# Patient Record
Sex: FEMALE | Race: BLACK OR AFRICAN AMERICAN | Employment: UNEMPLOYED | URBAN - METROPOLITAN AREA
[De-identification: names, ages, dates, MRNs, and addresses within clinical notes are randomized per-mention and may not be internally consistent; named-entity substitution may affect disease eponyms.]

---

## 2022-10-31 ENCOUNTER — HOSPITAL ENCOUNTER (EMERGENCY)
Age: 32
Discharge: LEFT BEFORE DISCHARGE | End: 2022-10-31
Attending: EMERGENCY MEDICINE

## 2022-10-31 VITALS
WEIGHT: 158.29 LBS | OXYGEN SATURATION: 100 % | RESPIRATION RATE: 21 BRPM | SYSTOLIC BLOOD PRESSURE: 133 MMHG | HEIGHT: 61 IN | TEMPERATURE: 98.2 F | HEART RATE: 112 BPM | DIASTOLIC BLOOD PRESSURE: 83 MMHG | BODY MASS INDEX: 29.89 KG/M2

## 2022-10-31 DIAGNOSIS — T78.40XA ALLERGIC REACTION, INITIAL ENCOUNTER: Primary | ICD-10-CM

## 2022-10-31 PROCEDURE — 2500000003 HC RX 250 WO HCPCS: Performed by: PHYSICIAN ASSISTANT

## 2022-10-31 PROCEDURE — 6360000002 HC RX W HCPCS: Performed by: PHYSICIAN ASSISTANT

## 2022-10-31 PROCEDURE — A4216 STERILE WATER/SALINE, 10 ML: HCPCS | Performed by: PHYSICIAN ASSISTANT

## 2022-10-31 PROCEDURE — 99284 EMERGENCY DEPT VISIT MOD MDM: CPT

## 2022-10-31 PROCEDURE — 96372 THER/PROPH/DIAG INJ SC/IM: CPT

## 2022-10-31 PROCEDURE — 2580000003 HC RX 258: Performed by: PHYSICIAN ASSISTANT

## 2022-10-31 PROCEDURE — 96374 THER/PROPH/DIAG INJ IV PUSH: CPT

## 2022-10-31 RX ORDER — EPINEPHRINE 1 MG/ML
0.3 INJECTION, SOLUTION, CONCENTRATE INTRAVENOUS ONCE
Status: COMPLETED | OUTPATIENT
Start: 2022-10-31 | End: 2022-10-31

## 2022-10-31 RX ORDER — DIPHENHYDRAMINE HCL 25 MG
25 CAPSULE ORAL EVERY 6 HOURS PRN
Qty: 20 CAPSULE | Refills: 0 | Status: SHIPPED | OUTPATIENT
Start: 2022-10-31

## 2022-10-31 RX ORDER — PREDNISONE 20 MG/1
40 TABLET ORAL DAILY
Qty: 10 TABLET | Refills: 0 | Status: SHIPPED | OUTPATIENT
Start: 2022-10-31 | End: 2022-11-05

## 2022-10-31 RX ADMIN — EPINEPHRINE 0.3 MG: 1 INJECTION, SOLUTION, CONCENTRATE INTRAVENOUS at 10:43

## 2022-10-31 RX ADMIN — FAMOTIDINE 20 MG: 10 INJECTION, SOLUTION INTRAVENOUS at 10:45

## 2022-10-31 ASSESSMENT — ENCOUNTER SYMPTOMS
STRIDOR: 0
VOMITING: 1
FACIAL SWELLING: 1
TROUBLE SWALLOWING: 0
COLOR CHANGE: 0
SHORTNESS OF BREATH: 1
SORE THROAT: 0

## 2022-10-31 ASSESSMENT — PAIN SCALES - GENERAL
PAINLEVEL_OUTOF10: 0
PAINLEVEL_OUTOF10: 0

## 2022-10-31 ASSESSMENT — PAIN - FUNCTIONAL ASSESSMENT
PAIN_FUNCTIONAL_ASSESSMENT: 0-10
PAIN_FUNCTIONAL_ASSESSMENT: 0-10

## 2022-10-31 NOTE — ED TRIAGE NOTES
Pt arrived to dept via EMS. Pt c/o allergic reaction w/ angioedema. Pt awake, alert and oriented x 3. Resp easy and unlabored. Pt placed in gown and on cardiac monitor. Call light in reach. Will continue to monitor.

## 2022-10-31 NOTE — ED PROVIDER NOTES
[address]      Pt Name: Willa Brewer  MRN: [id number]  [place] 1990  Date of evaluation: 10/31/2022  Provider: RITCHIE Weaver    This patient was seen and evaluated by the attending physician Dr. Jaylon Brownlee. CHIEF COMPLAINT       Chief Complaint   Patient presents with    Allergic Reaction     Pt ate [employer] food for there first time last night woke up, N/V/D and angioedema. Pt feels as though she is having difficulty breathing. Face/mouth is visibly swollen, pt having difficulty speaking. Pt allergies are shellfish and pepper. CRITICAL CARE TIME   I performed a total Critical Care time of  31 minutes, excluding separately reportable procedures. There was a high probability of clinically significant/life threatening deterioration in the patient's condition which required my urgent intervention. Not limited to multiple reexaminations, discussions with attending physician and consultants. HISTORY OF PRESENT ILLNESS  (Location/Symptom, Timing/Onset, Context/Setting, Quality, Duration, Modifying Factors, Severity.)   Willa Brewer is a 33 y.o. female who presents to the emergency department with concern for allergic reaction. She tells me that she has some unknown allergy to spices and shellfish and last night she had [facility] [place] food for the first time. She states she went to bed and woke up with nausea vomiting, diarrhea and abdominal bloating. Around 4 AM started to have some facial swelling lower lip jaw and felt short of breath. She had a couple episodes of vomiting which she states is because she feels like her throat feels swollen and is gagging her. She did not take a thing prior by EMS gave her 125 Solu-Medrol and 50 of Benadryl. She tells me she has a mental health history but they have not recently changed her medications.   She is not on any ACE inhibitor's or blood pressure medication    Nursing Notes were reviewed and I agree. REVIEW OF SYSTEMS    (2-9 systems for level 4, 10 or more for level 5)     Review of Systems   Constitutional:  Negative for fever. HENT:  Positive for facial swelling. Negative for drooling, sore throat and trouble swallowing. Respiratory:  Positive for shortness of breath. Negative for stridor. Gastrointestinal:  Positive for vomiting. Musculoskeletal:  Negative for neck pain and neck stiffness. Skin:  Positive for rash. Negative for color change. Neurological:  Negative for weakness. Psychiatric/Behavioral:  Negative for behavioral problems and confusion. Except as noted above the remainder of the review of systems was reviewed and negative. PAST MEDICAL HISTORY   History reviewed. No pertinent past medical history. SURGICAL HISTORY     History reviewed. No pertinent surgical history. CURRENT MEDICATIONS       Discharge Medication List as of 10/31/2022  2:18 PM          ALLERGIES     Shellfish-derived products    FAMILY HISTORY     History reviewed. No pertinent family history. No family status information on file. SOCIAL HISTORY      reports that she has been smoking cigarettes. She has been smoking an average of .5 packs per day. She has never used smokeless tobacco. She reports current alcohol use. PHYSICAL EXAM    (up to 7 for level 4, 8 or more for level 5)     ED Triage Vitals [10/31/22 1053]   BP Temp Temp Source Heart Rate Resp SpO2 Height Weight   129/76 98.2 °F (36.8 °C) Oral 100 19 100 % 5' 1\" (1.549 m) 158 lb 4.6 oz (71.8 kg)       Physical Exam  Vitals and nursing note reviewed. Constitutional:       Appearance: Normal appearance. HENT:      Mouth/Throat:      Mouth: Mucous membranes are moist. Angioedema present. Pharynx: Oropharynx is clear. No pharyngeal swelling. Tonsils: No tonsillar exudate. Eyes:      Pupils: Pupils are equal, round, and reactive to light. Cardiovascular:      Rate and Rhythm: Normal rate. Pulmonary:      Effort: Pulmonary effort is normal. No respiratory distress. Breath sounds: No wheezing. Musculoskeletal:         General: Normal range of motion. Cervical back: Normal range of motion. Skin:     General: Skin is warm. Findings: Rash present. Neurological:      General: No focal deficit present. Mental Status: She is alert and oriented to person, place, and time. Psychiatric:         Mood and Affect: Mood normal.         Behavior: Behavior normal.       DIAGNOSTIC RESULTS     NONE    LABS:  Labs Reviewed - No data to display    All other labs were within normal range or not returned as of this dictation. EMERGENCY DEPARTMENT COURSE and DIFFERENTIAL DIAGNOSIS/MDM:   Vitals:    Vitals:    10/31/22 1315 10/31/22 1330 10/31/22 1345 10/31/22 1400   BP: (!) 120/95 128/81 (!) 129/94 133/83   Pulse: 98 (!) 108 (!) 103 (!) 112   Resp: 18 21 18 21   Temp:       TempSrc:       SpO2:       Weight:       Height:         Patient got 125 Solu-Medrol and Benadryl prior to arrival.  She has not hypoxic throughout her stay. She did have some tachycardia initially which resolved. She has some angioedema with lower lip swelling there is no evidence of tongue swelling or posterior oropharynx is clear with a swelling underneath her jaw with some hives along her neck. There was some minimal improvement with epinephrine she tolerated p.o. No further vomiting. We recommended admission due to the swelling and location the patient decided to leave [address] She will be discharged with steroids and Benadryl and instructed to follow-up and return if she changes her mind at any time. CONSULTS:  None    PROCEDURES:  Procedures      FINAL IMPRESSION      1. Allergic reaction, initial encounter          DISPOSITION/PLAN   DISPOSITION [place] 10/31/2022 02:13:18 PM      PATIENT REFERRED TO:  No follow-up provider specified.     DISCHARGE MEDICATIONS:  Discharge Medication List as of 10/31/2022  2:18 PM        START taking these medications    Details   predniSONE (DELTASONE) 20 MG tablet Take 2 tablets by mouth daily for 5 days, Disp-10 tablet, R-0Print      diphenhydrAMINE (BENADRYL) 25 MG capsule Take 1 capsule by mouth every 6 hours as needed for Itching or Allergies Sedation precautions, Disp-20 capsule, R-0Print             (Please note that portions of this note were completed with a voice recognition program.  Efforts were made to edit the dictations but occasionally words are mis-transcribed.)    Tana Campos, [place]  10/31/22 3488

## 2022-10-31 NOTE — ED PROVIDER NOTES
ED Attending Attestation Note    This patient was seen by the advanced practice provider. I personally saw the patient and performed a substantive portion of the visit including all aspects of the medical decision making. Briefly, 33 y.o. female presents for evaluation of allergic reaction. Ate [place] [facility] food last night. Some gastro intestinal symptoms thereafter. This morning at 0400, developed edema of the lower lip. Previous allergic reaction to shellfish. Focused exam:   Gen: 33 y.o. female, NAD  HEENT: NCAT. PERRL. EOMI. There is angioedema of the lower lip. No tongue swelling or edema of the posterior oropharynx. Managing secretions easily. CV: RRR w/o MRG  Lungs: CTAB. No incr WOB. Abdomen: Soft, nontender, nondistended. No rebound/guarding. MDM:   This is a 60-year-old female presenting with allergic reaction. She is protecting her airway at this time and it does not seem to be much in the way of edema of the posterior oropharynx. She was treated with epinephrine and is already received Benadryl and IV steroids prior to arrival.  She was observed for several hours in the emergency department. She remains with angioedema of the lower lip and sensation of tightness in her throat. She is without any respiratory distress and continues to be able to maintain her airway. The recommendation to the patient was admission for observation for ongoing allergic reaction. The patient is not certain that she wishes to be admitted as she has family responsibilities to take care of. We have a long conversation about the risks and benefits, risks including worsening condition, permanent disability and death; benefits including additional observation/monitoring and therapeutics as needed. Should the patient elect to leave [address] she understands she can return to the emergency department at any time if she wishes to continue her evaluation and treatment.   As a next best alternative she will be discharged [address] with a course of steroids and instructions to utilize antihistamines. CRITICAL CARE  I personally saw the patient and independently provided 25 minutes of non-concurrent critical care out of the total shared critical care time provided. This excludes seperately billable procedures. Critical care time was provided for allergic reaction requiring epinephrine and that required close evaluation and/or intervention with concern for potential patient decompensation. For further details of the patient's emergency department visit, please see the advanced practice provider's documentation. John Martinez MD     This report has been produced using speech recognition software and may contain errors related to that system including errors in grammar, punctuation, and spelling, as well as words and phrases that may be inappropriate. If there are any questions or concerns please feel free to contact the dictating provider for clarification.       John Martinez MD  10/31/22 4933

## 2022-12-19 ENCOUNTER — HOSPITAL ENCOUNTER (EMERGENCY)
Age: 32
Discharge: HOME | End: 2022-12-19
Payer: MEDICAID

## 2022-12-19 VITALS
WEIGHT: 142.6 LBS | HEIGHT: 61 IN | HEART RATE: 98 BPM | OXYGEN SATURATION: 100 % | TEMPERATURE: 98.4 F | SYSTOLIC BLOOD PRESSURE: 136 MMHG | DIASTOLIC BLOOD PRESSURE: 92 MMHG | BODY MASS INDEX: 26.92 KG/M2 | RESPIRATION RATE: 16 BRPM

## 2022-12-19 DIAGNOSIS — F41.0 PANIC ATTACK: Primary | ICD-10-CM

## 2022-12-19 PROCEDURE — 99283 EMERGENCY DEPT VISIT LOW MDM: CPT

## 2022-12-19 PROCEDURE — 6370000000 HC RX 637 (ALT 250 FOR IP): Performed by: NURSE PRACTITIONER

## 2022-12-19 RX ORDER — HYDROXYZINE PAMOATE 25 MG/1
50 CAPSULE ORAL ONCE
Status: COMPLETED | OUTPATIENT
Start: 2022-12-19 | End: 2022-12-19

## 2022-12-19 RX ORDER — PAROXETINE HYDROCHLORIDE 20 MG/1
20 TABLET, FILM COATED ORAL DAILY
Qty: 30 TABLET | Refills: 0 | Status: SHIPPED | OUTPATIENT
Start: 2022-12-19

## 2022-12-19 RX ORDER — HYDROXYZINE HYDROCHLORIDE 25 MG/1
25 TABLET, FILM COATED ORAL 3 TIMES DAILY PRN
Qty: 30 TABLET | Refills: 0 | Status: SHIPPED | OUTPATIENT
Start: 2022-12-19 | End: 2022-12-29

## 2022-12-19 RX ADMIN — HYDROXYZINE PAMOATE 50 MG: 25 CAPSULE ORAL at 19:58

## 2022-12-19 ASSESSMENT — PAIN - FUNCTIONAL ASSESSMENT: PAIN_FUNCTIONAL_ASSESSMENT: NONE - DENIES PAIN

## 2022-12-19 NOTE — Clinical Note
Ludin Hill was seen and treated in our emergency department on 12/19/2022. [place] [employer]:   Patient was seen and evaluated in the emergency department today. Please assist patient in rescheduling her flight for the validity of her ticket.     Kulwinder Odell, FREDY - CNP

## 2022-12-20 NOTE — ED NOTES
Patient prepared for and ready to be discharged. Patient discharged at this time in no acute distress after verbalizing understanding of discharge instructions. Patient left after receiving After Visit Summary instructions.         Cinthya Bagley RN  12/19/22 1968

## 2022-12-20 NOTE — ED PROVIDER NOTES
[address]  eMERGENCY dEPARTMENT eNCOUnter    Pt Name: @@  MRN: [id number]  Birthdate1990  Date of evaluation: 12/19/2022  Provider: FREDY Rajput CNP      Independently evaluated by the advanced practice provider    CHIEF COMPLAINT       Chief Complaint   Patient presents with    Panic Attack         HISTORY OF PRESENT ILLNESS  (Location/Symptom, Timing/Onset, Context/Setting, Quality, Duration, Modifying Factors, Severity.)   Drew Jaime is a 33 y.o. female who presents to the emergencydepartment PMHx:  depression, ADHD, panic attacks, anxiety    Patient presented to the emergency department requesting a prescription for hydroxyzine. Patient reports that she and her daughter moved to [place] from [place] 3 months ago. She is currently staying with a friend. She and her mother do not get along. She has been out of her Paxil, Wellbutrin, hydroxyzine for nearly 3 months. States that she applied for state [place] [employer] but she was denied because she could not produce a birth certificate herself. She now has her birth certificate and will be reapplying. States that she has an appointment with Dolores Hutchison on Friday to get a refill on her prescription for the ADHD medication refill and will discuss with them getting the prescription for Wellbutrin and BuSpar as well. She has limited money to pay for prescriptions at the present time. Patient reports that an incident occurred today when she went to the airlines to board a flight, she had a panic attack, had never flown before and was not able to board the flight. She was directed by the airline staff that she could get assistance with rescheduling another flight based on her current ticket value if she was seen and evaluated medically and could produce a health providers note. She was taking her daughter to [place] to spend time with family.   She is planned on staying a couple of days and then returning back to live in [place] again. Nursing Notes were reviewed and I agree. REVIEW OF SYSTEMS    (2-9 systems for level 4, 10 or more for level 5)     Review of Systems   Psychiatric/Behavioral:  Positive for decreased concentration and sleep disturbance. Negative for agitation, confusion, hallucinations, self-injury and suicidal ideas. The patient is nervous/anxious. All other systems reviewed and are negative. Except as noted above the remainder of the review of systems was reviewed and negative. PAST MEDICAL HISTORY   History reviewed. No pertinent past medical history. SURGICAL HISTORY           Procedure Laterality Date     SECTION         CURRENT MEDICATIONS       Previous Medications    No medications on file       ALLERGIES     Shellfish-derived products    FAMILY HISTORY     History reviewed. No pertinent family history. No family status information on file. SOCIAL HISTORY      reports that she has been smoking cigarettes. She has been smoking an average of .5 packs per day. She has never used smokeless tobacco. She reports current alcohol use. PHYSICAL EXAM    (up to 7 for level 4, 8 or more for level 5)      ED Triage Vitals [22 1915]   BP Temp Temp Source Heart Rate Resp SpO2 Height Weight   (!) 136/92 98.4 °F (36.9 °C) Oral 98 16 100 % 5' 1\" (1.549 m) 142 lb 9.6 oz (64.7 kg)       Physical Exam  Vitals and nursing note reviewed. Constitutional:       General: She is not in acute distress. Appearance: She is not ill-appearing, toxic-appearing or diaphoretic. Cardiovascular:      Rate and Rhythm: Normal rate. Pulses: Normal pulses. Pulmonary:      Effort: Pulmonary effort is normal.   Skin:     General: Skin is warm and dry. Capillary Refill: Capillary refill takes less than 2 seconds. Neurological:      General: No focal deficit present. Mental Status: She is alert and oriented to person, place, and time. Psychiatric:         Attention and Perception: Perception normal. She does not perceive auditory or visual hallucinations. Mood and Affect: Mood is anxious. Mood is not depressed or elated. Affect is not labile, blunt, flat, angry or inappropriate. Speech: Speech normal.         Behavior: Behavior normal.         Thought Content: Thought content normal. Thought content does not include homicidal or suicidal ideation. Thought content does not include homicidal or suicidal plan. Cognition and Memory: Cognition and memory normal.         Judgment: Judgment normal.         DIAGNOSTIC RESULTS     RADIOLOGY:   Non-plain film images such as CT, Ultrasound and MRI are read by the radiologist. Plain radiographic images are visualized and preliminarily interpreted by FREDY Sims CNP with the below findings:        Interpretation per the Radiologist below, if available at the time of this note:    No orders to display       LABS:  Labs Reviewed - No data to display    All other labs were within normal range or not returned as of this dictation. EMERGENCY DEPARTMENT COURSE and DIFFERENTIAL DIAGNOSIS/MDM:   Vitals:    Vitals:    12/19/22 1915   BP: (!) 136/92   Pulse: 98   Resp: 16   Temp: 98.4 °F (36.9 °C)   TempSrc: Oral   SpO2: 100%   Weight: 142 lb 9.6 oz (64.7 kg)   Height: 5' 1\" (1.549 m)     Medications   hydrOXYzine pamoate (VISTARIL) capsule 50 mg (50 mg Oral Given 12/19/22 1958)       MDM    Patient was seen and evaluated per myself. Dr. Mary Treviño present available for consultation as needed. As stated above patient missed her flight today due to a panic attack. She had never flown before. She is under a bit of stress. She is also out of Wellbutrin Paxil BuSpar. She is currently taking her ADHD medication. She has no healthcare provider here in [place]. Denies homicidal suicidal ideation. She is specifically requesting a refill for hydroxyzine.   She has an appointment with [facility] to discuss getting her Wellbutrin and BuSpar and Paxil. As well as a refill on her ADHD medication. She [place] re[place] for [place] and her children. Patient is calm cooperative. I can tell that she is struggling a bit to articulate her thoughts consistently. She does focus. She thinks when she is trying to explain. She is not homicidal or suicidal.  I see no evidence of carley or tangential thinking. She is directable and cooperative and pleasant. I have provided her a note to the airlines for them to assist her on rescheduling her flight based on her ticket value per the instructions provided by the paperwork from the airline. I also will provide her prescription for Paxil and for the hydroxyzine. She does not have any financial support right now but she will use a prescription assistance card and both of these prescription should be at a reduced rate with the prescription card that she should be able to afford to pay for them. Plan of care was discussed with the patient. She is in agreement. I am also providing her a referral to the [address] [place] [address] to establish care upon her return from her [place] trip. She was very appreciative. She reiterates that she has her ADH medication and she does have enough to last her until she sees [address] [place] [address]. PROCEDURES:  Procedures    FINAL IMPRESSION      1.  Panic attack          DISPOSITION/PLAN   DISPOSITION Decision To Discharge 12/19/2022 07:28:46 PM      PATIENT REFERRED TO:  [facility] medical clinic  call and make an appt to establish care    [phone]  Schedule an appointment as soon as possible for a visit       [address] Emergency Department  [place] Bill  [phone]    As needed, If symptoms worsen    DISCHARGE MEDICATIONS:  New Prescriptions    HYDROXYZINE HCL (ATARAX) 25 MG TABLET    Take 1 tablet by mouth 3 times daily as needed for Itching    PAROXETINE (PAXIL) 20 MG TABLET    Take 1 tablet by mouth daily       (Please note that portions of this note werecompleted with a voice recognition program.  Efforts were made to edit the dictations but occasionally words are mis-transcribed.)    FREDY Méndez - MAYKEL      This dictation was performed with a verbal recognition program (DRAGON) and it was checked for errors. It is possible that there are still dictated errors within this office note. If so, please bring any errors to my attention for an addendum. All efforts were made to ensure that this office note is accurate.        FREDY Wan CNP  12/19/22 2000